# Patient Record
Sex: MALE | Race: WHITE | NOT HISPANIC OR LATINO | URBAN - METROPOLITAN AREA
[De-identification: names, ages, dates, MRNs, and addresses within clinical notes are randomized per-mention and may not be internally consistent; named-entity substitution may affect disease eponyms.]

---

## 2021-09-27 ENCOUNTER — OFFICE (OUTPATIENT)
Dept: URBAN - METROPOLITAN AREA CLINIC 102 | Facility: CLINIC | Age: 46
End: 2021-09-27
Payer: MEDICAID

## 2021-09-27 VITALS
HEART RATE: 85 BPM | WEIGHT: 205 LBS | HEIGHT: 70 IN | SYSTOLIC BLOOD PRESSURE: 125 MMHG | TEMPERATURE: 98.3 F | DIASTOLIC BLOOD PRESSURE: 86 MMHG

## 2021-09-27 DIAGNOSIS — Z72.0 TOBACCO USE: ICD-10-CM

## 2021-09-27 DIAGNOSIS — R74.8 ABNORMAL LEVELS OF OTHER SERUM ENZYMES: ICD-10-CM

## 2021-09-27 PROCEDURE — 99204 OFFICE O/P NEW MOD 45 MIN: CPT | Performed by: PHYSICIAN ASSISTANT

## 2021-09-27 NOTE — SERVICEHPINOTES
Mr. Flores is a 46 yr old male here to discuss a high alk phos. Pt has a hx of TBI an lives with his parents d/t this. His alk phos from 7/23/21 is 136 and the rest of the hepatic function panel is negative. Hep C ab is nonreactive. Per his mother, this was found upon a yearly check-up. Pt feels well and doesn't complain of anything. No increased fatigue. No itching, dark urine, etc. The only new medication is his inhaler. He smokes 1 ppd and no ETOH use. No herbal supplementation use. No family hx of liver disease. No abdominal pain. No recent abx use. He had an abdominal u/s which is unremarkable per patient report (not available today).